# Patient Record
Sex: FEMALE | Race: WHITE | URBAN - METROPOLITAN AREA
[De-identification: names, ages, dates, MRNs, and addresses within clinical notes are randomized per-mention and may not be internally consistent; named-entity substitution may affect disease eponyms.]

---

## 2022-01-25 ENCOUNTER — OFFICE VISIT (OUTPATIENT)
Dept: URBAN - METROPOLITAN AREA CLINIC 12 | Facility: CLINIC | Age: 57
End: 2022-01-25
Payer: COMMERCIAL

## 2022-01-25 DIAGNOSIS — K64.8 INTERNAL HEMORRHOIDS: ICD-10-CM

## 2022-01-25 DIAGNOSIS — K57.30 DIVERTICULA OF COLON: ICD-10-CM

## 2022-01-25 DIAGNOSIS — Z86.010 HX OF ADENOMATOUS COLONIC POLYPS: ICD-10-CM

## 2022-01-25 DIAGNOSIS — Z83.71 FAMILY HISTORY OF COLONIC POLYPS: ICD-10-CM

## 2022-01-25 PROBLEM — 90458007: Status: ACTIVE | Noted: 2022-01-25

## 2022-01-25 PROBLEM — 733657002: Status: ACTIVE | Noted: 2022-01-25

## 2022-01-25 PROBLEM — 429969003: Status: ACTIVE | Noted: 2022-01-25

## 2022-01-25 PROCEDURE — 99213 OFFICE O/P EST LOW 20 MIN: CPT | Performed by: INTERNAL MEDICINE

## 2022-01-25 RX ORDER — SODIUM, POTASSIUM,MAG SULFATES 17.5-3.13G
344MLL AS DIRECTED SOLUTION, RECONSTITUTED, ORAL ORAL
Qty: 1 KIT | Refills: 0 | OUTPATIENT
Start: 2022-01-25 | End: 2022-01-27

## 2022-01-25 NOTE — HPI-TODAY'S VISIT:
57 yo woman presents for follow up. She was last seen in the office on 2/26/19.  She has a history of adenomatous colon polyps in the past.  Her last colonoscopy was done 3 years ago and had an adenomatous colon polyp removed from the transverse colon and ascending colon and was also noted to have sigmoid diverticulosis and internal hemorrhoids.  She states that she has had no problems with her bowel habits and they are regular and normal.  There is been no bleeding.  There is been no rectal pain or itching.  There is been no abdominal pain.  There is been no fever chills or sweats.  There is been no diverticulitis.  She does see her primary care physician for routine healthcare maintenance.  Patient states that she has not gotten the COVID-19 vaccinations. She states that she did have COVID-19 infection more than a year ago and recovered fully.  There has been no fever chills or sweats.  There has been no chest pain or shortness of breath or fatigue.

## 2022-03-07 PROBLEM — 429047008: Status: ACTIVE | Noted: 2022-01-25

## 2022-03-16 ENCOUNTER — OFFICE VISIT (OUTPATIENT)
Dept: URBAN - METROPOLITAN AREA SURGERY CENTER 15 | Facility: SURGERY CENTER | Age: 57
End: 2022-03-16
Payer: COMMERCIAL

## 2022-03-16 ENCOUNTER — TELEPHONE ENCOUNTER (OUTPATIENT)
Dept: URBAN - METROPOLITAN AREA CLINIC 23 | Facility: CLINIC | Age: 57
End: 2022-03-16

## 2022-03-16 ENCOUNTER — CLAIMS CREATED FROM THE CLAIM WINDOW (OUTPATIENT)
Dept: URBAN - METROPOLITAN AREA CLINIC 4 | Facility: CLINIC | Age: 57
End: 2022-03-16
Payer: COMMERCIAL

## 2022-03-16 DIAGNOSIS — D12.2 BENIGN NEOPLASM OF ASCENDING COLON: ICD-10-CM

## 2022-03-16 DIAGNOSIS — Z86.010 ADENOMAS PERSONAL HISTORY OF COLONIC POLYPS: ICD-10-CM

## 2022-03-16 DIAGNOSIS — D12.2 ADENOMA OF ASCENDING COLON: ICD-10-CM

## 2022-03-16 PROCEDURE — G8907 PT DOC NO EVENTS ON DISCHARG: HCPCS | Performed by: INTERNAL MEDICINE

## 2022-03-16 PROCEDURE — 88305 TISSUE EXAM BY PATHOLOGIST: CPT | Performed by: PATHOLOGY

## 2022-03-16 PROCEDURE — 45385 COLONOSCOPY W/LESION REMOVAL: CPT | Performed by: INTERNAL MEDICINE

## 2022-03-17 ENCOUNTER — TELEPHONE ENCOUNTER (OUTPATIENT)
Dept: URBAN - METROPOLITAN AREA CLINIC 12 | Facility: CLINIC | Age: 57
End: 2022-03-17

## 2022-03-21 ENCOUNTER — TELEPHONE ENCOUNTER (OUTPATIENT)
Dept: URBAN - METROPOLITAN AREA CLINIC 111 | Facility: CLINIC | Age: 57
End: 2022-03-21

## 2023-09-07 ENCOUNTER — DASHBOARD ENCOUNTERS (OUTPATIENT)
Age: 58
End: 2023-09-07

## 2023-09-07 ENCOUNTER — TELEPHONE ENCOUNTER (OUTPATIENT)
Dept: URBAN - METROPOLITAN AREA CLINIC 6 | Facility: CLINIC | Age: 58
End: 2023-09-07

## 2023-09-07 ENCOUNTER — OFFICE VISIT (OUTPATIENT)
Dept: URBAN - METROPOLITAN AREA CLINIC 111 | Facility: CLINIC | Age: 58
End: 2023-09-07
Payer: COMMERCIAL

## 2023-09-07 VITALS
SYSTOLIC BLOOD PRESSURE: 110 MMHG | DIASTOLIC BLOOD PRESSURE: 72 MMHG | HEART RATE: 59 BPM | HEIGHT: 66 IN | BODY MASS INDEX: 24.49 KG/M2 | WEIGHT: 152.4 LBS

## 2023-09-07 DIAGNOSIS — R10.10 UPPER ABDOMINAL PAIN: ICD-10-CM

## 2023-09-07 DIAGNOSIS — K76.89 LIVER CYST: ICD-10-CM

## 2023-09-07 DIAGNOSIS — Z86.010 HX OF ADENOMATOUS POLYP OF COLON: ICD-10-CM

## 2023-09-07 PROBLEM — 85057007: Status: ACTIVE | Noted: 2023-09-07

## 2023-09-07 PROCEDURE — 99213 OFFICE O/P EST LOW 20 MIN: CPT | Performed by: NURSE PRACTITIONER

## 2023-09-07 NOTE — HPI-TODAY'S VISIT:
58 ho female presents abd pain for one week. Reports epigastric pain moving bilater upper abd  and back.  Went to  on 9/1/23 labs/UA rev'd nml. Has lots of gas, took omeprazole for 4 days, stopped as it wasn't working. Has normal bowelments. s/p priscilla.   Denies fever, chills, nausea, vomiting, early satiety, dysphagia, odynophagia, melena, hematochezia or weight loss.fever, chills,  CT scan by urologist  in 3/23 rev'd liver cyst 1.9cm otherwise unremarkable   Last colonoscopy in 3/22 10mm SSA polyp, diverticulosis, repeat in 3 years Last egd in 2019--negative

## 2023-09-20 LAB
H PYLORI BREATH TEST: NOT DETECTED
H. PYLORI BREATH TEST: NOT DETECTED
INTERPRETATION: NOT DETECTED

## 2023-09-21 ENCOUNTER — TELEPHONE ENCOUNTER (OUTPATIENT)
Dept: URBAN - METROPOLITAN AREA CLINIC 111 | Facility: CLINIC | Age: 58
End: 2023-09-21

## 2025-01-15 ENCOUNTER — OFFICE VISIT (OUTPATIENT)
Dept: URBAN - METROPOLITAN AREA CLINIC 12 | Facility: CLINIC | Age: 60
End: 2025-01-15
Payer: COMMERCIAL

## 2025-01-15 VITALS
WEIGHT: 148.2 LBS | TEMPERATURE: 97.7 F | DIASTOLIC BLOOD PRESSURE: 74 MMHG | HEIGHT: 66 IN | HEART RATE: 61 BPM | SYSTOLIC BLOOD PRESSURE: 108 MMHG | BODY MASS INDEX: 23.82 KG/M2

## 2025-01-15 DIAGNOSIS — R13.10 PILL DYSPHAGIA: ICD-10-CM

## 2025-01-15 DIAGNOSIS — R09.89 CHRONIC THROAT CLEARING: ICD-10-CM

## 2025-01-15 DIAGNOSIS — Z86.0101 HISTORY OF ADENOMATOUS AND SERRATED COLON POLYPS: ICD-10-CM

## 2025-01-15 DIAGNOSIS — R13.19 ESOPHAGEAL DYSPHAGIA: ICD-10-CM

## 2025-01-15 PROBLEM — 40890009: Status: ACTIVE | Noted: 2025-01-15

## 2025-01-15 PROBLEM — 428283002: Status: ACTIVE | Noted: 2025-01-15

## 2025-01-15 PROBLEM — 8801000175101: Status: ACTIVE | Noted: 2025-01-15

## 2025-01-15 PROBLEM — 248589007: Status: ACTIVE | Noted: 2025-01-15

## 2025-01-15 PROCEDURE — 99214 OFFICE O/P EST MOD 30 MIN: CPT

## 2025-01-15 NOTE — HPI-TODAY'S VISIT:
Pt is a 58 yo female presents today for a surveillance colonscopy and dysphagia.  Her last colon was in 3/2022 which revealed a 10mm SSA in ascending colon, IH. Repeat in 3 years was recommended.   Patient denies abdominal pain, change in bowel habits, or rectal bleeding. Reports regular daily bowel movements without straining. No FH of CRC.   She reports dysphagia, particularly with pills, and chronic throat clearing. She experiences difficulty swallowing dry foods daily but denies issues with liquids. Symptoms have been present for a couple of years and are worsening.  Denies heartburn, acid reflux, epigastric pain, nausea, or vomiting. Denies frequent NSAIDs use. Previous endoscopy was performed in 2009 without significant findings.   Denies problems with heart, lungs or kidneys. No trouble with anesthesia in the past.

## 2025-02-24 ENCOUNTER — CLAIMS CREATED FROM THE CLAIM WINDOW (OUTPATIENT)
Dept: URBAN - METROPOLITAN AREA SURGERY CENTER 15 | Facility: SURGERY CENTER | Age: 60
End: 2025-02-24
Payer: COMMERCIAL

## 2025-02-24 ENCOUNTER — CLAIMS CREATED FROM THE CLAIM WINDOW (OUTPATIENT)
Dept: URBAN - METROPOLITAN AREA CLINIC 4 | Facility: CLINIC | Age: 60
End: 2025-02-24
Payer: COMMERCIAL

## 2025-02-24 DIAGNOSIS — R13.19 CERVICAL DYSPHAGIA: ICD-10-CM

## 2025-02-24 DIAGNOSIS — Z12.11 COLON CANCER SCREENING: ICD-10-CM

## 2025-02-24 DIAGNOSIS — K29.70 GASTRITIS, UNSPECIFIED, WITHOUT BLEEDING: ICD-10-CM

## 2025-02-24 DIAGNOSIS — K63.5 BENIGN COLON POLYP: ICD-10-CM

## 2025-02-24 DIAGNOSIS — K22.89 OTHER SPECIFIED DISEASE OF ESOPHAGUS: ICD-10-CM

## 2025-02-24 DIAGNOSIS — Z86.0101 H/O ADENOMATOUS POLYP OF COLON: ICD-10-CM

## 2025-02-24 DIAGNOSIS — K63.5 COLON POLYP: ICD-10-CM

## 2025-02-24 DIAGNOSIS — K31.89 REACTIVE GASTROPATHY: ICD-10-CM

## 2025-02-24 DIAGNOSIS — K63.5 POLYP OF COLON: ICD-10-CM

## 2025-02-24 DIAGNOSIS — Z86.0101 HISTORY OF ADENOMATOUS POLYP OF COLON: ICD-10-CM

## 2025-02-24 PROCEDURE — 88342 IMHCHEM/IMCYTCHM 1ST ANTB: CPT | Performed by: PATHOLOGY

## 2025-02-24 PROCEDURE — 88312 SPECIAL STAINS GROUP 1: CPT | Performed by: PATHOLOGY

## 2025-02-24 PROCEDURE — 88305 TISSUE EXAM BY PATHOLOGIST: CPT | Performed by: PATHOLOGY

## 2025-02-24 PROCEDURE — 43239 EGD BIOPSY SINGLE/MULTIPLE: CPT | Performed by: INTERNAL MEDICINE

## 2025-02-24 PROCEDURE — 00813 ANES UPR LWR GI NDSC PX: CPT | Performed by: NURSE ANESTHETIST, CERTIFIED REGISTERED

## 2025-02-24 PROCEDURE — 43249 ESOPH EGD DILATION <30 MM: CPT | Performed by: INTERNAL MEDICINE

## 2025-02-24 PROCEDURE — 0529F INTRVL 3/>YR PTS CLNSCP DOCD: CPT | Performed by: INTERNAL MEDICINE

## 2025-02-24 PROCEDURE — 45385 COLONOSCOPY W/LESION REMOVAL: CPT | Performed by: INTERNAL MEDICINE

## 2025-03-10 ENCOUNTER — OFFICE VISIT (OUTPATIENT)
Dept: URBAN - METROPOLITAN AREA CLINIC 12 | Facility: CLINIC | Age: 60
End: 2025-03-10
Payer: COMMERCIAL

## 2025-03-10 VITALS
WEIGHT: 151 LBS | DIASTOLIC BLOOD PRESSURE: 73 MMHG | TEMPERATURE: 97.9 F | HEART RATE: 49 BPM | HEIGHT: 66 IN | BODY MASS INDEX: 24.27 KG/M2 | SYSTOLIC BLOOD PRESSURE: 114 MMHG

## 2025-03-10 DIAGNOSIS — R13.19 ESOPHAGEAL DYSPHAGIA: ICD-10-CM

## 2025-03-10 DIAGNOSIS — R09.89 CHRONIC THROAT CLEARING: ICD-10-CM

## 2025-03-10 DIAGNOSIS — Z86.0101 HISTORY OF ADENOMATOUS AND SERRATED COLON POLYPS: ICD-10-CM

## 2025-03-10 DIAGNOSIS — K57.90 DIVERTICULOSIS: ICD-10-CM

## 2025-03-10 DIAGNOSIS — R13.10 PILL DYSPHAGIA: ICD-10-CM

## 2025-03-10 PROBLEM — 397881000: Status: ACTIVE | Noted: 2025-03-10

## 2025-03-10 PROCEDURE — 99214 OFFICE O/P EST MOD 30 MIN: CPT

## 2025-03-10 NOTE — HPI-TODAY'S VISIT:
Patient is a 59-year-old female presents today for an EGD/colon follow-up.  She was seen by me on 1/15/25 for history of TA and SSA, and dysphagia.    EGD/colon on 2/24/2025.   EGD: Normal esophagus-dilated, gastritis.  Esophageal bx w/o significant abnormality. Gastric bx neg for HP or IM.   Colon: A hyperplastic polyp, diverticulosis in the sigmoid colon, IH.  Repeat in 7 years advised.  Today, she reports an improvement in symptoms after EGD/dilation. She states that she immediately noticed a difference in throat clearing and dysphagia and has not experienced any further episodes since then. She denies any GERD symptoms. Denies history of diverticulitis, or recent changes in bowel habits.   Last OV note from 1/15/25 Pt is a 60 yo female presents today for a surveillance colonscopy and dysphagia.  Her last colon was in 3/2022 which revealed a 10mm SSA in ascending colon, IH. Repeat in 3 years was recommended.  Patient denies abdominal pain, change in bowel habits, or rectal bleeding. Reports regular daily bowel movements without straining. No FH of CRC.   She reports dysphagia, particularly with pills, and chronic throat clearing. She experiences difficulty swallowing dry foods daily but denies issues with liquids. Symptoms have been present for a couple of years and are worsening.  Denies heartburn, acid reflux, epigastric pain, nausea, or vomiting. Denies frequent NSAIDs use. Previous endoscopy was performed in 2009 without significant findings.   Denies problems with heart, lungs or kidneys. No trouble with anesthesia in the past.

## 2025-06-28 NOTE — PHYSICAL EXAM CONSTITUTIONAL:
Ready for xr and ct   well developed, well nourished , in no acute distress , ambulating without difficulty , normal communication ability